# Patient Record
Sex: FEMALE | Race: WHITE | URBAN - METROPOLITAN AREA
[De-identification: names, ages, dates, MRNs, and addresses within clinical notes are randomized per-mention and may not be internally consistent; named-entity substitution may affect disease eponyms.]

---

## 2018-05-17 ENCOUNTER — TELEPHONE (OUTPATIENT)
Dept: FAMILY MEDICINE | Facility: CLINIC | Age: 33
End: 2018-05-17

## 2018-05-17 NOTE — TELEPHONE ENCOUNTER
Dad called and is wondering if he needs to take Daughter to UC or can wait until tomorrow to be seen. Has been [place] out of the [place] and has had diarrhea and HA for the past few days. Last episode of diarrhea was about 7 hours ago. She is drinking water and keeping it down so far. Advised UC if she has had HA for over 48 hours and Tylenol and IBU alternating not helping. Advised UC that can do I.V's is she is still having diarrhea tonight. Can be seen tomorrow if HA are better with OTC medication and having diarrhea with keeping fluids down.    He is fine with this plan and will consult with his Daughter when she lands

## 2018-05-18 ENCOUNTER — OFFICE VISIT (OUTPATIENT)
Dept: FAMILY MEDICINE | Facility: CLINIC | Age: 33
End: 2018-05-18

## 2018-05-18 VITALS
OXYGEN SATURATION: 99 % | SYSTOLIC BLOOD PRESSURE: 102 MMHG | BODY MASS INDEX: 27.23 KG/M2 | HEART RATE: 80 BPM | TEMPERATURE: 97.8 F | HEIGHT: 70 IN | DIASTOLIC BLOOD PRESSURE: 60 MMHG | WEIGHT: 190.2 LBS

## 2018-05-18 DIAGNOSIS — E86.0 DEHYDRATION: ICD-10-CM

## 2018-05-18 DIAGNOSIS — A09 TRAVELER'S DIARRHEA: Primary | ICD-10-CM

## 2018-05-18 PROBLEM — Z76.89 HEALTH CARE HOME: Status: ACTIVE | Noted: 2018-05-18

## 2018-05-18 PROCEDURE — 99202 OFFICE O/P NEW SF 15 MIN: CPT | Performed by: PHYSICIAN ASSISTANT

## 2018-05-18 RX ORDER — AZITHROMYCIN 250 MG/1
500 TABLET, FILM COATED ORAL DAILY
Qty: 6 TABLET | Refills: 0 | Status: SHIPPED | OUTPATIENT
Start: 2018-05-18 | End: 2018-05-21

## 2018-05-18 NOTE — PROGRESS NOTES
"CC: Diarrhea    History:  Yumiko lives in [place] and is home to visit her family here. Plans to fly up back to [place] later this weekend, and has appt with GP at home on Tues. Is a  for [employer] that she has been doing since January.     Yumiko was in [facility] and [place] for most of the last week. On on of the last night, she was out with a group of 21 drinking, but nobody else got sick. This was Monday night in [place] time- woke up with headache, rumbling stomach. Has been having very watery stool, minor headache with photosensitivity, eye pressure, head pressure, lethargic/low energy. No blood in stool. Since onset 4 days ago, has not been eating as much.    Did drink bottled water in [facility], but did have tap water in [place].     PMH, MEDICATIONS, ALLERGIES, SOCIAL AND FAMILY HISTORY in [facility] and reviewed by me personally.      ROS negative other than the symptoms noted above in the HPI.      Examination   /60 (BP Location: Left arm, Patient Position: Chair, Cuff Size: Adult Large)  Pulse 80  Temp 97.8  F (36.6  C) (Oral)  Ht 1.778 m (5' 10\")  Wt 86.3 kg (190 lb 3.2 oz)  SpO2 99%  Breastfeeding? No  BMI 27.29 kg/m2       Constitutional: Sitting comfortably, in no acute distress. Vital signs noted  Eyes: pupils equal round reactive to light and accomodation, extra ocular movements intact  Nose: patent, without mucosal abnormalities  Mouth and throat: without erythema or lesions of the mucosa  Neck:  no adenopathy, trachea midline and normal to palpation  Cardiovascular:  regular rate and rhythm, no murmurs, clicks, or gallops  Respiratory:  normal respiratory rate and rhythm, lungs clear to auscultation  Abdomen: Abdomen soft, mildly tender across lower abdomen. BS normal. No masses, organomegaly  SKIN: No jaundice/pallor/rash.   Psychiatric: mentation appears normal and affect normal/bright        A/P    ICD-10-CM    1. Traveler's diarrhea A09 azithromycin " (ZITHROMAX) 250 MG tablet       DISCUSSION:  Consistent with traveler's diarrhea. Could still be viral, but reasonable to treat with antibiotic at this time. Will prescribe 3 day course of azithromycin per recommendation for [place] travelers. Take with food, monitor for side effects.    Headache, mental cloudiness, overall fatigue, low appetite, likely stemming from the infection and dehydration.  Push fluids, including Pedialyte, small, bland, frequent, binding meals (bananas, rice, applesauce, toast, pasta). Avoid dairy, fried foods, raw vegetables.     Symptoms should gradually resolve. As appetite returns, try not to return to normal diet too quickly.     If symptoms continue despite antibiotic, follow up with GP in hometown, for possible further blood/stool testing. Consider getting further treatment prior to flying if symptoms worsen. Consider ER for IV fluids.       follow up visit: As needed    [facility]

## 2018-05-18 NOTE — PATIENT INSTRUCTIONS
Consistent with traveler's diarrhea. Could still be viral, but reasonable to treat with antibiotic at this time. Will prescribe 3 day course of azithromycin per recommendation for [place] travelers. Take with food, monitor for side effects.    Headache, mental cloudiness, overall fatigue, low appetite, likely stemming from the infection and dehydration.  Push fluids, including Pedialyte, small, bland, frequent, binding meals (bananas, rice, applesauce, toast, pasta). Avoid dairy, fried foods, raw vegetables.     Symptoms should gradually resolved. As appetite returns, try to not to return to normal diet too quickly.     If symptoms continue despite antibiotic, follow up with GP in hometown, for possible further blood/stool testing. Consider getting further treatment prior to flying if symptoms worsen.

## 2018-05-18 NOTE — NURSING NOTE
Yumiko is here for monday night pt began not feeling well, pt had been out drinking and thought she was hung over but hasnt become better, really watery stools. Pt also states she feels a pressure in her head and has light sensitivity, like shes in a aquarium.    Pre-Visit Screening :  Immunizations : up to date    Colonoscopy : na  Mammogram : na  Asthma Action Test/Plan : na  PHQ9/GAD7 :  Na    Pulse - regular  My Chart - declines    CLASSIFICATION OF OVERWEIGHT AND OBESITY BY BMI                         Obesity Class           BMI(kg/m2)  Underweight                                    < 18.5  Normal                                         18.5-24.9  Overweight                                     25.0-29.9  OBESITY                     I                  30.0-34.9                              II                 35.0-39.9  EXTREME OBESITY             III                >40                             Patient's  BMI Body mass index is 22.15 kg/(m^2).  http://hin.nhlbi.nih.gov/menuplanner/menu.cgi  Questioned patient about current smoking habits.  Pt. has never smoked.  The patient has verbalized that it is ok to leave a detailed voice message on the patient's cell phone with results/recommendations from this visit.       Verified [phone]  phone number:

## 2018-05-18 NOTE — MR AVS SNAPSHOT
After Visit Summary   5/18/2018    ELHAM RÍOS    MRN: [id number]           Patient Information     Date Of Birth          1985        Visit Information        Provider Department      5/18/2018 2:00 PM Carley Gallego PA-C [facility]        Today's Diagnoses     Traveler's diarrhea    -  1      Care Instructions    Consistent with traveler's diarrhea. Could still be viral, but reasonable to treat with antibiotic at this time. Will prescribe 3 day course of azithromycin per recommendation for [place] travelers. Take with food, monitor for side effects.    Headache, mental cloudiness, overall fatigue, low appetite, likely stemming from the infection and dehydration.  Push fluids, including Pedialyte, small, bland, frequent, binding meals (bananas, rice, applesauce, toast, pasta). Avoid dairy, fried foods, raw vegetables.     Symptoms should gradually resolved. As appetite returns, try to not to return to normal diet too quickly.     If symptoms continue despite antibiotic, follow up with GP in hometown, for possible further blood/stool testing. Consider getting further treatment prior to flying if symptoms worsen.          Follow-ups after your visit        Who to contact     If you have questions or need follow up information about today's clinic visit or your schedule please contact [facility] directly at [phone].  Normal or non-critical lab and imaging results will be communicated to you by MyChart, letter or phone within 4 business days after the clinic has received the results. If you do not hear from us within 7 days, please contact the clinic through MyChart or phone. If you have a critical or abnormal lab result, we will notify you by phone as soon as possible.  Submit refill requests through [employer] or call your pharmacy and they will forward the refill request to us. Please allow 3 business days for your refill to be  "completed.          Additional Information About Your Visit        [employer] Information     [employer] lets you send messages to your doctor, view your test results, renew your prescriptions, schedule appointments and more. To sign up, go to [email or web address] . Click on \"Log in\" on the left side of the screen, which will take you to the Welcome page. Then click on \"Sign up Now\" on the right side of the page.     You will be asked to enter the access code listed below, as well as some personal information. Please follow the directions to create your username and password.     Your access code is: [email or web address]  Expires: 2018  2:58 PM     Your access code will  in 90 days. If you need help or a new code, please call your [place] clinic or [phone].        Care EveryWhere ID     This is your Care EveryWhere ID. This could be used by other organizations to access your [place] medical records  [id number]        Your Vitals Were     Pulse Temperature Height Pulse Oximetry Breastfeeding? BMI (Body Mass Index)    80 97.8  F (36.6  C) (Oral) 1.778 m (5' 10\") 99% No 27.29 kg/m2       Blood Pressure from Last 3 Encounters:   18 102/60   08 110/68    Weight from Last 3 Encounters:   18 86.3 kg (190 lb 3.2 oz)   08 82.9 kg (182 lb 12.8 oz)              Today, you had the following     No orders found for display         Today's Medication Changes          These changes are accurate as of 18  2:58 PM.  If you have any questions, ask your nurse or doctor.               Start taking these medicines.        Dose/Directions    azithromycin 250 MG tablet   Commonly known as:  ZITHROMAX   Used for:  Traveler's diarrhea   Started by:  Carley Gallego PA-C        Dose:  500 mg   Take 2 tablets (500 mg) by mouth daily for 3 days   Quantity:  6 tablet   Refills:  0            Where to get your medicines      These medications were sent to [facility] [address] [place] [address] " - [address]  [address], [facility] [address]     Phone:  [phone]     azithromycin 250 MG tablet                Primary Care Provider Office Phone # Fax #    Carley Silvia Gallego PA-C [phone] [address] [facility] [address]  [facility] [address]        Equal Access to Services     [facility] : Hadii aad ku hadasho Soomaali, waaxda luqadaha, qaybta kaalmada adeegyada, waxay idiin hayaan adeeg kharash la'aan . So [facility] [phone].    ATENCIÓN: Si habla español, tiene a hollins disposición servicios gratuitos de asistencia lingüística. Marlyame al [phone].    We comply with applicable federal civil rights laws and [place] laws. We do not discriminate on the basis of race, color, national origin, age, disability, sex, sexual orientation, or gender identity.            Thank you!     Thank you for choosing [facility]  for your care. Our goal is always to provide you with excellent care. Hearing back from our patients is one way we can continue to improve our services. Please take a few minutes to complete the written survey that you may receive in the mail after your visit with us. Thank you!             Your Updated Medication List - Protect others around you: Learn how to safely use, store and throw away your medicines at www.disposemymeds.org.          This list is accurate as of 5/18/18  2:58 PM.  Always use your most recent med list.                   Brand Name Dispense Instructions for use Diagnosis    albuterol 90 MCG/ACT inhaler     1    1-2 p every 4 hours    Asthma, mild intermittent       azithromycin 250 MG tablet    ZITHROMAX    6 tablet    Take 2 tablets (500 mg) by mouth daily for 3 days    Traveler's diarrhea

## 2018-05-20 ENCOUNTER — HEALTH MAINTENANCE LETTER (OUTPATIENT)
Age: 33
End: 2018-05-20

## 2024-06-17 PROBLEM — Z76.89 HEALTH CARE HOME: Status: RESOLVED | Noted: 2018-05-18 | Resolved: 2024-06-17
